# Patient Record
Sex: MALE | URBAN - METROPOLITAN AREA
[De-identification: names, ages, dates, MRNs, and addresses within clinical notes are randomized per-mention and may not be internally consistent; named-entity substitution may affect disease eponyms.]

---

## 2018-07-05 NOTE — ASU PATIENT PROFILE, ADULT - PMH
DM2 (diabetes mellitus, type 2)    HLD (hyperlipidemia)    HTN (hypertension)    Lumbar disc herniation  L5-S1

## 2018-07-06 ENCOUNTER — OUTPATIENT (OUTPATIENT)
Dept: OUTPATIENT SERVICES | Facility: HOSPITAL | Age: 53
LOS: 1 days | Discharge: HOME | End: 2018-07-06
Payer: COMMERCIAL

## 2018-07-06 VITALS — HEART RATE: 72 BPM | OXYGEN SATURATION: 95 % | SYSTOLIC BLOOD PRESSURE: 134 MMHG | DIASTOLIC BLOOD PRESSURE: 80 MMHG

## 2018-07-06 VITALS
HEART RATE: 63 BPM | TEMPERATURE: 99 F | WEIGHT: 218.7 LBS | RESPIRATION RATE: 15 BRPM | OXYGEN SATURATION: 99 % | DIASTOLIC BLOOD PRESSURE: 95 MMHG | HEIGHT: 69 IN | SYSTOLIC BLOOD PRESSURE: 128 MMHG

## 2018-07-06 DIAGNOSIS — Z98.52 VASECTOMY STATUS: Chronic | ICD-10-CM

## 2018-07-06 DIAGNOSIS — Z98.890 OTHER SPECIFIED POSTPROCEDURAL STATES: Chronic | ICD-10-CM

## 2018-07-06 DIAGNOSIS — H33.012 RETINAL DETACHMENT WITH SINGLE BREAK, LEFT EYE: ICD-10-CM

## 2018-07-06 DIAGNOSIS — H33.022 RETINAL DETACHMENT WITH MULTIPLE BREAKS, LEFT EYE: ICD-10-CM

## 2018-07-06 LAB — GLUCOSE BLDC GLUCOMTR-MCNC: 154 MG/DL — HIGH (ref 70–99)

## 2018-07-06 PROCEDURE — C1784: CPT

## 2018-07-06 PROCEDURE — C1889: CPT

## 2018-07-06 PROCEDURE — 82962 GLUCOSE BLOOD TEST: CPT

## 2018-07-06 PROCEDURE — 67108 REPAIR DETACHED RETINA: CPT | Mod: LT

## 2018-07-06 PROCEDURE — C1814: CPT

## 2018-07-06 NOTE — ASU DISCHARGE PLAN (ADULT/PEDIATRIC). - SPECIAL INSTRUCTIONS
face down during day for 2 days, then face down 30 minutes  twice per day for 5 days during waking hours  sleep  on left side down.  great neck office tomorrow.

## 2018-07-06 NOTE — ASU DISCHARGE PLAN (ADULT/PEDIATRIC). - PT EDUC
other (specify)/Gas bubble instructions reviewed with good understanding ,gas bubble bracelet on pt.

## 2019-04-30 ENCOUNTER — OUTPATIENT (OUTPATIENT)
Dept: OUTPATIENT SERVICES | Facility: HOSPITAL | Age: 54
LOS: 1 days | End: 2019-04-30
Payer: COMMERCIAL

## 2019-04-30 VITALS
OXYGEN SATURATION: 100 % | DIASTOLIC BLOOD PRESSURE: 85 MMHG | HEART RATE: 68 BPM | RESPIRATION RATE: 16 BRPM | SYSTOLIC BLOOD PRESSURE: 115 MMHG

## 2019-04-30 VITALS
TEMPERATURE: 98 F | HEIGHT: 68 IN | WEIGHT: 206.79 LBS | DIASTOLIC BLOOD PRESSURE: 82 MMHG | HEART RATE: 68 BPM | RESPIRATION RATE: 20 BRPM | OXYGEN SATURATION: 99 % | SYSTOLIC BLOOD PRESSURE: 121 MMHG

## 2019-04-30 DIAGNOSIS — H33.022 RETINAL DETACHMENT WITH MULTIPLE BREAKS, LEFT EYE: ICD-10-CM

## 2019-04-30 DIAGNOSIS — Z98.52 VASECTOMY STATUS: Chronic | ICD-10-CM

## 2019-04-30 DIAGNOSIS — Z98.890 OTHER SPECIFIED POSTPROCEDURAL STATES: Chronic | ICD-10-CM

## 2019-04-30 PROBLEM — E78.5 HYPERLIPIDEMIA, UNSPECIFIED: Chronic | Status: ACTIVE | Noted: 2018-07-06

## 2019-04-30 PROBLEM — E11.9 TYPE 2 DIABETES MELLITUS WITHOUT COMPLICATIONS: Chronic | Status: ACTIVE | Noted: 2018-07-06

## 2019-04-30 PROBLEM — I10 ESSENTIAL (PRIMARY) HYPERTENSION: Chronic | Status: ACTIVE | Noted: 2018-07-06

## 2019-04-30 PROBLEM — M51.26 OTHER INTERVERTEBRAL DISC DISPLACEMENT, LUMBAR REGION: Chronic | Status: ACTIVE | Noted: 2018-07-06

## 2019-04-30 LAB
ANION GAP SERPL CALC-SCNC: 15 MMOL/L — SIGNIFICANT CHANGE UP (ref 5–17)
BUN SERPL-MCNC: 26 MG/DL — HIGH (ref 7–23)
CALCIUM SERPL-MCNC: 9.1 MG/DL — SIGNIFICANT CHANGE UP (ref 8.4–10.5)
CHLORIDE SERPL-SCNC: 101 MMOL/L — SIGNIFICANT CHANGE UP (ref 96–108)
CO2 SERPL-SCNC: 18 MMOL/L — LOW (ref 22–31)
CREAT SERPL-MCNC: 0.93 MG/DL — SIGNIFICANT CHANGE UP (ref 0.5–1.3)
GLUCOSE BLDC GLUCOMTR-MCNC: 279 MG/DL — HIGH (ref 70–99)
GLUCOSE BLDC GLUCOMTR-MCNC: 284 MG/DL — HIGH (ref 70–99)
GLUCOSE BLDC GLUCOMTR-MCNC: 378 MG/DL — HIGH (ref 70–99)
GLUCOSE SERPL-MCNC: 385 MG/DL — HIGH (ref 70–99)
POTASSIUM SERPL-MCNC: 4.5 MMOL/L — SIGNIFICANT CHANGE UP (ref 3.5–5.3)
POTASSIUM SERPL-SCNC: 4.5 MMOL/L — SIGNIFICANT CHANGE UP (ref 3.5–5.3)
SODIUM SERPL-SCNC: 134 MMOL/L — LOW (ref 135–145)

## 2019-04-30 PROCEDURE — 67108 REPAIR DETACHED RETINA: CPT | Mod: LT

## 2019-04-30 PROCEDURE — 82962 GLUCOSE BLOOD TEST: CPT

## 2019-04-30 PROCEDURE — 36415 COLL VENOUS BLD VENIPUNCTURE: CPT

## 2019-04-30 PROCEDURE — 99241 OFFICE CONSULTATION NEW/ESTAB PATIENT 15 MIN: CPT

## 2019-04-30 PROCEDURE — C1889: CPT

## 2019-04-30 PROCEDURE — 80048 BASIC METABOLIC PNL TOTAL CA: CPT

## 2019-04-30 NOTE — ASU DISCHARGE PLAN (ADULT/PEDIATRIC) - PROCEDURE
retina surgery retina surgery scleral bucklev endolaser c3f8 gas left eye scleral buckle, pars plana vitrectomy, endolaser, repair retina, C3F8 gas, air fluid exchange left eye scleral buckle, pars plana vitrectomy, endolasder, repair retina, C3F8 gas, air fluid exchange

## 2019-04-30 NOTE — ASU DISCHARGE PLAN (ADULT/PEDIATRIC) - ASU DC SPECIAL INSTRUCTIONSFT
do not rub eye. tylenol for discomfort.  avoid advil motrin aleve aspirin to decrease chance of bleeding. eye kit given to pt.  Discharge and follow up  instructions explained to pt.  pt understands proper use of eye drops and instructions. f/u tomorrow @  . [phone] do not rub eye. tylenol for discomfort.  avoid advil motrin aleve aspirin to decrease chance of bleeding. eye kit given to pt.  Discharge and follow up  instructions explained to pt.  pt understands proper use of eye drops and instructions. f/u tomorrow @845 . [phone]

## 2019-04-30 NOTE — ASU DISCHARGE PLAN (ADULT/PEDIATRIC) - CARE PROVIDER_API CALL
Diogo Clinton (MD)  Ophthalmology  [address] [place] [address], [address]  [place] [address]  Phone: [phone]  Fax: [phone]  Follow Up Time:

## 2019-04-30 NOTE — ASU DISCHARGE PLAN (ADULT/PEDIATRIC) - PATIENT EDUCATION MATERIALS PROVIED
d/c and f/u instructions/Other (specify) Other (specify)/d/c and f/u instructions. face down gas bubble bracelet applied.Gas bubble restrictions reviewed with pt Implant card (specify)/Other (specify)/Gas bubble card, Eye shield instructions and eye kit given to patient

## 2019-04-30 NOTE — ASU DISCHARGE PLAN (ADULT/PEDIATRIC) - NURSING INSTRUCTIONS
do not rub eye. tylenol for discomfort.  avoid advil motrin aleve aspirin to decrease chance of bleeding. eye kit given to pt.  Discharge and follow up  instructions explained to pt.  pt understands proper use of eye drops and instructions. do not rub eye. tylenol for discomfort.  avoid advil motrin aleve aspirin to decrease chance of bleeding. eye kit given to pt.  Discharge and follow up  instructions explained to pt.  pt understands proper use of eye drops and instructions. do not lie on back Do not rub the eye. Tylenol or extra strength tylenol if needed for discomfort, avoid   Advil, Motrin, Aleve and aspirin to minimize bleeding.  Appointment with Dr. Dempsey on 5/1/19 @ 8:45am

## 2019-04-30 NOTE — ASU DISCHARGE PLAN (ADULT/PEDIATRIC) - CALL YOUR DOCTOR IF YOU HAVE ANY OF THE FOLLOWING:
Nausea and vomiting that does not stop/Inability to tolerate liquids or foods/Fever greater than (need to indicate Fahrenheit or Celsius)/Pain not relieved by Medications/Bleeding that does not stop Swelling that gets worse/Pain not relieved by Medications/Fever greater than (need to indicate Fahrenheit or Celsius)/Nausea and vomiting that does not stop/Bleeding that does not stop

## 2019-05-03 DIAGNOSIS — E11.65 TYPE 2 DIABETES MELLITUS WITH HYPERGLYCEMIA: ICD-10-CM

## 2019-05-03 NOTE — PROGRESS NOTE ADULT - SUBJECTIVE AND OBJECTIVE BOX
Chief Complaint: follow up phone call    History: Type 2 DM with hyperglycemia    MEDICATIONS  (STANDING)  Metformin  BID  Tajenta daily  Prandin ac

## 2022-08-18 NOTE — ASU PATIENT PROFILE, ADULT - TOBACCO USE
Never smoker Gabapentin Counseling: I discussed with the patient the risks of gabapentin including but not limited to dizziness, somnolence, fatigue and ataxia.

## 2023-05-27 NOTE — PROGRESS NOTE ADULT - ASSESSMENT
Garret is a 47 year old male who [facility] for  [facility] pain.  The pain has been present for 2 weeks and is localized to the right lower back.  The pain started while the patient was twisting.   It does  radiate to the right leg  and is not associated with weakness or paresthesias of the extremity.  There were no bladder or bowel changes noted.  The pain is aggravated by movement and lifting.   OTC meds:  Yes    Medical records reviewed:  Yes  .    Chest Pain: no  SOB:  no  Urinary Symptoms:  no  History of Cancer:  no  Trauma:  no  Weight Loss:  No       Hx of prior minor back or neck problems: Yes  Hx of bulging discs: No  Hx of herniated disc: No  Prior Surgery on Back:  No  Patient Active Problem List   Diagnosis   • Bleeding internal hemorrhoids   • Left inguinal hernia   • Tobacco abuse   • Umbilical hernia without obstruction and without gangrene     Past Medical History:   Diagnosis Date   • Bleeding internal hemorrhoids 3/3/2020   • RAD (reactive airway disease)     Allergy induced- from grain dust.   '          All:    ALLERGIES:   Allergen Reactions   • Aspirin SWELLING     as a child   • Erythromycin SWELLING     as a child   • Penicillins SWELLING     as a child     Current Outpatient Medications   Medication Sig Dispense Refill   • varenicline (CHANTIX PURVI) 0.5 MG X 11 & 1 MG X 42 tablet Days 1 to 3: Take one 0.5 mg tablet in the morning. Days 4 to 7: Take one 0.5 mg tablet in the morning and one 0.5 mg tablet in the evening. Day 8 to Day 28: Take one 1 mg tablet in the morning and one 1 mg tablet in the evening. 53 tablet 0   • tadalafil (CIALIS) 10 MG tablet Take 1 tablet by mouth as needed for Erectile Dysfunction. 30 tablet 1     No current facility-administered medications for this visit.         OBJECTIVE  Visit Vitals  BP (!) 121/93 (BP Location: RUE - Right upper extremity, Patient Position: Sitting, Cuff Size: Regular)   Pulse 87   Temp 98 °F (36.7 °C) (Tympanic)   Resp 18    SpO2 98%       General appearance:nontoxic in appearance, no acute distress  Lungs:  clear to auscultation, good aeration  CV:  regular rate and rhythm, no murmurs      Back exam: [+] tenderness to palpation at L5-S1 Right                      Trunk rotation: pain to the left                       Neck motion: ok                       Skin rash: [-]                      Discoloration: [-]                           Straight-leg Raise: negative             Arm motion: ok      Peripheral pulse was intact, capillary refill was normal, sensory function was intact     Strength Testing: ok     ASSESSMENT:/PLAN:  Acute musculoskeletal back strain with muscle spasm and with  features suggestive of acute sciatica   Discussed this with the patient.   Rest, heat or ice as discussed  Meds: [+]    The patient indicates understanding of these issues and agrees with the plan.   Side effects of all medications were discussed.  Patient is instructed to follow up in 2-3 days or as needed.  Patient is to go to the emergency room for any significant change or worsening.  Patient was discharged in a stable condition and was in agreement with the plan.  No further questions.    Suhas Hardin, DO   S/P cataract surgery history of Type 2 DM with hyperglycemia due to  poor self management skills.  Seen  by his PCP who increased metformin and added trajenta and prandin.  He stated he is monitoring his glucose  more frequently, having  less thirst and urination  with  glucose levels @ 250mg/dl.  He started to make lifestyle changes and  agrees to  MNT.

## 2023-10-04 NOTE — ASU DISCHARGE PLAN (ADULT/PEDIATRIC) - ASU DISCHARGE DATE/TIME
30-Apr-2019 Z Plasty Text: The lesion was extirpated to the level of the fat with a #15c scalpel blade.  Given the location of the defect, shape of the defect and the proximity to free margins a Z-plasty was deemed most appropriate for repair.  Using a sterile surgical marker, the appropriate transposition arms of the Z-plasty were drawn incorporating the defect and placing the expected incisions within the relaxed skin tension lines where possible.    The area thus outlined was incised deep to adipose tissue with a #15 scalpel blade.  The skin margins were undermined to an appropriate distance in all directions utilizing iris scissors.  The opposing transposition arms were then transposed into place in opposite direction and anchored with interrupted buried subcutaneous sutures.